# Patient Record
Sex: FEMALE
[De-identification: names, ages, dates, MRNs, and addresses within clinical notes are randomized per-mention and may not be internally consistent; named-entity substitution may affect disease eponyms.]

---

## 2019-12-09 ENCOUNTER — APPOINTMENT (OUTPATIENT)
Dept: INTERNAL MEDICINE | Facility: CLINIC | Age: 64
End: 2019-12-09
Payer: COMMERCIAL

## 2019-12-09 VITALS
SYSTOLIC BLOOD PRESSURE: 120 MMHG | HEIGHT: 62 IN | TEMPERATURE: 98.6 F | OXYGEN SATURATION: 97 % | WEIGHT: 209 LBS | BODY MASS INDEX: 38.46 KG/M2 | DIASTOLIC BLOOD PRESSURE: 80 MMHG | HEART RATE: 90 BPM

## 2019-12-09 DIAGNOSIS — K21.9 GASTRO-ESOPHAGEAL REFLUX DISEASE W/OUT ESOPHAGITIS: ICD-10-CM

## 2019-12-09 DIAGNOSIS — S92.302A FRACTURE OF UNSPECIFIED METATARSAL BONE(S), LEFT FOOT, INITIAL ENCOUNTER FOR CLOSED FRACTURE: ICD-10-CM

## 2019-12-09 DIAGNOSIS — Z80.1 FAMILY HISTORY OF MALIGNANT NEOPLASM OF TRACHEA, BRONCHUS AND LUNG: ICD-10-CM

## 2019-12-09 DIAGNOSIS — E66.9 OBESITY, UNSPECIFIED: ICD-10-CM

## 2019-12-09 DIAGNOSIS — Z83.438 FAMILY HISTORY OF OTHER DISORDER OF LIPOPROTEIN METABOLISM AND OTHER LIPIDEMIA: ICD-10-CM

## 2019-12-09 DIAGNOSIS — Z82.3 FAMILY HISTORY OF STROKE: ICD-10-CM

## 2019-12-09 DIAGNOSIS — Z81.8 FAMILY HISTORY OF OTHER MENTAL AND BEHAVIORAL DISORDERS: ICD-10-CM

## 2019-12-09 DIAGNOSIS — Z00.00 ENCOUNTER FOR GENERAL ADULT MEDICAL EXAMINATION W/OUT ABNORMAL FINDINGS: ICD-10-CM

## 2019-12-09 DIAGNOSIS — Z82.49 FAMILY HISTORY OF ISCHEMIC HEART DISEASE AND OTHER DISEASES OF THE CIRCULATORY SYSTEM: ICD-10-CM

## 2019-12-09 PROCEDURE — 99202 OFFICE O/P NEW SF 15 MIN: CPT

## 2019-12-09 RX ORDER — CALCIUM CARBONATE 300MG(750)
750 TABLET,CHEWABLE ORAL
Refills: 0 | Status: ACTIVE | COMMUNITY
Start: 2019-12-09

## 2019-12-09 NOTE — HISTORY OF PRESENT ILLNESS
[FreeTextEntry8] : Patient presented for the first time for transition of care, she's looking for a new PCP.   She hasn't seen an MD for many years.\par \par Pt lost a lot of weight in 2014, then slowly gaining most of it back.\par \par Pt went to ED in 9/2019 after coming home from [place].  She had LE edema, but work up was negative.  She was given diuretics, and it resolved in about 1 week, and also gave her ABx.\par \par She also L 5th metatarsal fracture in 6/2019.   It's hurting a little.  She will follow up with ortho soon.\par \par Pt had heart evaluation 5-6 years ago, stress test, echo and carotid duplex were OK.\par \par Pt got flu vaccine at the pharmacy.

## 2019-12-09 NOTE — ASSESSMENT
[FreeTextEntry1] : Patient will return for a physical exam.  I gave her prescription to get routine labs prior to her visit.  Patient is also overdue for all healthcare maintenance tests.  She will need to follow-up with GI for repeat colonoscopy.  GYN for repeat Pap smear.  I gave her prescription to get mammogram and DEXA scan.  She was also reminded to have routine eye exam and dental care.

## 2019-12-09 NOTE — PHYSICAL EXAM
[No Acute Distress] : no acute distress [Well Nourished] : well nourished [Well Developed] : well developed [No JVD] : no jugular venous distention [Supple] : supple [No Respiratory Distress] : no respiratory distress  [Clear to Auscultation] : lungs were clear to auscultation bilaterally [Normal Rate] : normal rate  [Regular Rhythm] : with a regular rhythm [No Edema] : there was no peripheral edema [Normal S1, S2] : normal S1 and S2 [Soft] : abdomen soft [No Extremity Clubbing/Cyanosis] : no extremity clubbing/cyanosis [Non Tender] : non-tender [Normal Bowel Sounds] : normal bowel sounds [No CVA Tenderness] : no CVA  tenderness [No Spinal Tenderness] : no spinal tenderness [No Joint Swelling] : no joint swelling [Grossly Normal Strength/Tone] : grossly normal strength/tone [Normal Affect] : the affect was normal [Alert and Oriented x3] : oriented to person, place, and time [Speech Grossly Normal] : speech grossly normal [Normal Mood] : the mood was normal [de-identified] : Obese female in stated age,

## 2021-02-03 ENCOUNTER — APPOINTMENT (OUTPATIENT)
Dept: INTERNAL MEDICINE | Facility: CLINIC | Age: 66
End: 2021-02-03

## 2025-02-09 ENCOUNTER — EMERGENCY (EMERGENCY)
Facility: HOSPITAL | Age: 70
LOS: 1 days | Discharge: HOME | End: 2025-02-09
Attending: EMERGENCY MEDICINE
Payer: COMMERCIAL

## 2025-02-09 VITALS
RESPIRATION RATE: 18 BRPM | OXYGEN SATURATION: 95 % | SYSTOLIC BLOOD PRESSURE: 134 MMHG | DIASTOLIC BLOOD PRESSURE: 86 MMHG | HEART RATE: 71 BPM

## 2025-02-09 VITALS
HEART RATE: 90 BPM | RESPIRATION RATE: 16 BRPM | DIASTOLIC BLOOD PRESSURE: 88 MMHG | SYSTOLIC BLOOD PRESSURE: 137 MMHG | TEMPERATURE: 99 F | OXYGEN SATURATION: 99 %

## 2025-02-09 LAB
ADD ON TEST-SPECIMEN IN LAB: SIGNIFICANT CHANGE UP
ALBUMIN SERPL ELPH-MCNC: 4.1 G/DL — SIGNIFICANT CHANGE UP (ref 3.3–5)
ALP SERPL-CCNC: 83 U/L — SIGNIFICANT CHANGE UP (ref 40–120)
ALT FLD-CCNC: 21 U/L — SIGNIFICANT CHANGE UP (ref 10–45)
ANION GAP SERPL CALC-SCNC: 13 MMOL/L — SIGNIFICANT CHANGE UP (ref 5–17)
APPEARANCE UR: CLEAR — SIGNIFICANT CHANGE UP
APTT BLD: 37.9 SEC — HIGH (ref 24.5–35.6)
AST SERPL-CCNC: 21 U/L — SIGNIFICANT CHANGE UP (ref 10–40)
BACTERIA # UR AUTO: NEGATIVE /HPF — SIGNIFICANT CHANGE UP
BASOPHILS # BLD AUTO: 0.02 K/UL — SIGNIFICANT CHANGE UP (ref 0–0.2)
BASOPHILS NFR BLD AUTO: 0.3 % — SIGNIFICANT CHANGE UP (ref 0–2)
BILIRUB SERPL-MCNC: 0.4 MG/DL — SIGNIFICANT CHANGE UP (ref 0.2–1.2)
BILIRUB UR-MCNC: NEGATIVE — SIGNIFICANT CHANGE UP
BUN SERPL-MCNC: 18 MG/DL — SIGNIFICANT CHANGE UP (ref 7–23)
CALCIUM SERPL-MCNC: 9.9 MG/DL — SIGNIFICANT CHANGE UP (ref 8.4–10.5)
CAST: 2 /LPF — SIGNIFICANT CHANGE UP (ref 0–4)
CHLORIDE SERPL-SCNC: 104 MMOL/L — SIGNIFICANT CHANGE UP (ref 96–108)
CO2 SERPL-SCNC: 23 MMOL/L — SIGNIFICANT CHANGE UP (ref 22–31)
COLOR SPEC: YELLOW — SIGNIFICANT CHANGE UP
CREAT SERPL-MCNC: 1 MG/DL — SIGNIFICANT CHANGE UP (ref 0.5–1.3)
DIFF PNL FLD: NEGATIVE — SIGNIFICANT CHANGE UP
EGFR: 61 ML/MIN/1.73M2 — SIGNIFICANT CHANGE UP
EOSINOPHIL # BLD AUTO: 0.13 K/UL — SIGNIFICANT CHANGE UP (ref 0–0.5)
EOSINOPHIL NFR BLD AUTO: 2.1 % — SIGNIFICANT CHANGE UP (ref 0–6)
GLUCOSE SERPL-MCNC: 116 MG/DL — HIGH (ref 70–99)
GLUCOSE UR QL: NEGATIVE MG/DL — SIGNIFICANT CHANGE UP
HCT VFR BLD CALC: 47.7 % — HIGH (ref 34.5–45)
HGB BLD-MCNC: 15 G/DL — SIGNIFICANT CHANGE UP (ref 11.5–15.5)
IMM GRANULOCYTES NFR BLD AUTO: 0.5 % — SIGNIFICANT CHANGE UP (ref 0–0.9)
INR BLD: 1.06 RATIO — SIGNIFICANT CHANGE UP (ref 0.85–1.16)
KETONES UR-MCNC: ABNORMAL MG/DL
LEUKOCYTE ESTERASE UR-ACNC: NEGATIVE — SIGNIFICANT CHANGE UP
LIDOCAIN IGE QN: 33 U/L — SIGNIFICANT CHANGE UP (ref 7–60)
LYMPHOCYTES # BLD AUTO: 0.66 K/UL — LOW (ref 1–3.3)
LYMPHOCYTES # BLD AUTO: 10.6 % — LOW (ref 13–44)
MCHC RBC-ENTMCNC: 28 PG — SIGNIFICANT CHANGE UP (ref 27–34)
MCHC RBC-ENTMCNC: 31.4 G/DL — LOW (ref 32–36)
MCV RBC AUTO: 89.2 FL — SIGNIFICANT CHANGE UP (ref 80–100)
MONOCYTES # BLD AUTO: 0.51 K/UL — SIGNIFICANT CHANGE UP (ref 0–0.9)
MONOCYTES NFR BLD AUTO: 8.2 % — SIGNIFICANT CHANGE UP (ref 2–14)
NEUTROPHILS # BLD AUTO: 4.89 K/UL — SIGNIFICANT CHANGE UP (ref 1.8–7.4)
NEUTROPHILS NFR BLD AUTO: 78.3 % — HIGH (ref 43–77)
NITRITE UR-MCNC: POSITIVE
NRBC # BLD: 0 /100 WBCS — SIGNIFICANT CHANGE UP (ref 0–0)
NRBC BLD-RTO: 0 /100 WBCS — SIGNIFICANT CHANGE UP (ref 0–0)
PH UR: 5.5 — SIGNIFICANT CHANGE UP (ref 5–8)
PLATELET # BLD AUTO: 223 K/UL — SIGNIFICANT CHANGE UP (ref 150–400)
POTASSIUM SERPL-MCNC: 3.9 MMOL/L — SIGNIFICANT CHANGE UP (ref 3.5–5.3)
POTASSIUM SERPL-SCNC: 3.9 MMOL/L — SIGNIFICANT CHANGE UP (ref 3.5–5.3)
PROT SERPL-MCNC: 7.5 G/DL — SIGNIFICANT CHANGE UP (ref 6–8.3)
PROT UR-MCNC: NEGATIVE MG/DL — SIGNIFICANT CHANGE UP
PROTHROM AB SERPL-ACNC: 12.2 SEC — SIGNIFICANT CHANGE UP (ref 9.9–13.4)
RBC # BLD: 5.35 M/UL — HIGH (ref 3.8–5.2)
RBC # FLD: 14.4 % — SIGNIFICANT CHANGE UP (ref 10.3–14.5)
RBC CASTS # UR COMP ASSIST: 0 /HPF — SIGNIFICANT CHANGE UP (ref 0–4)
REVIEW: SIGNIFICANT CHANGE UP
SODIUM SERPL-SCNC: 140 MMOL/L — SIGNIFICANT CHANGE UP (ref 135–145)
SP GR SPEC: 1.02 — SIGNIFICANT CHANGE UP (ref 1–1.03)
SQUAMOUS # UR AUTO: 2 /HPF — SIGNIFICANT CHANGE UP (ref 0–5)
UROBILINOGEN FLD QL: 0.2 MG/DL — SIGNIFICANT CHANGE UP (ref 0.2–1)
WBC # BLD: 6.24 K/UL — SIGNIFICANT CHANGE UP (ref 3.8–10.5)
WBC # FLD AUTO: 6.24 K/UL — SIGNIFICANT CHANGE UP (ref 3.8–10.5)
WBC UR QL: 8 /HPF — HIGH (ref 0–5)

## 2025-02-09 RX ORDER — ACETAMINOPHEN 160 MG/5ML
1000 SUSPENSION ORAL ONCE
Refills: 0 | Status: COMPLETED | OUTPATIENT
Start: 2025-02-09 | End: 2025-02-09

## 2025-02-09 RX ADMIN — ACETAMINOPHEN 400 MILLIGRAM(S): 160 SUSPENSION ORAL at 15:46

## 2025-02-09 NOTE — ED ADULT NURSE NOTE - NSFALLUNIVINTERV_ED_ALL_ED
Bed/Stretcher in lowest position, wheels locked, appropriate side rails in place/Call bell, personal items and telephone in reach/Instruct patient to call for assistance before getting out of bed/chair/stretcher/Non-slip footwear applied when patient is off stretcher/[place] to call system/Physically safe environment - no spills, clutter or unnecessary equipment/Purposeful proactive rounding/Room/bathroom lighting operational, light cord in reach

## 2025-02-09 NOTE — ED PROVIDER NOTE - NSFOLLOWUPINSTRUCTIONS_ED_ALL_ED_FT
you were seen in the ED for abdominal pain  attached are your CT results which we discussed, please follow up with GI for your fatty liver and MRI of abdomen. please get high resolution CT chest.    Someone from the ED should contact you to help schedule your specialty appointment. if someone does not contact you please use a number provided below to help schedule your appointment.    You are to follow-up in the next 1-2 weeks with [facility] at [facility]. Please call [phone] for an appointment.    for your pain you may use lidocaine patches, and tylenol every 1000mg 6hrs for pain.    SEEK IMMEDIATE MEDICAL CARE IF YOU HAVE ANY OF THE FOLLOWING SYMPTOMS: worsening abdominal pain, uncontrollable vomiting, profuse diarrhea, inability to have bowel movements or pass gas, black or bloody stools, fever accompanying chest pain or back pain, or fainting. These symptoms may represent a serious problem that is an emergency. Do not wait to see if the symptoms will go away. Get medical help right away. Call [phone] and do not drive yourself to the hospital.    you were found to have Atrial fibulation which resolved, you were recommended to start blood thinners to prevent risk of stroke, but stated you will follow up with cardiology for continued management and right now don't want to be on blood thinners due to fall risk.    please return to ED for trouble speaking, numbness, tingling, weakness to upper or lower extremity.    Someone from the ED should contact you to help schedule your specialty appointment with cardiology/ afib clinic. if someone does not contact you please use a number provided below to help schedule your appointment.    You are to follow-up in the next 1-2 weeks with [facility] at [facility]. Please call [phone] for an appointment.

## 2025-02-09 NOTE — ED PROVIDER NOTE - NS ED ATTENDING STATEMENT MOD
I have seen and examined this patient and fully participated in the care of this patient as the teaching attending.  The service was shared with the [facility].  I reviewed and verified the documentation.

## 2025-02-09 NOTE — ED PROVIDER NOTE - OBJECTIVE STATEMENT
69-year-old female with no PMH here for evaluation of right sided groin/hip pain for the past 2 days.  Patient denies any inciting trauma or events, pain is worse with ambulation and changing of position of the leg but states pain can be present even when she is sitting still.  She has been taking Tylenol without improvement of her pain, denies any urinary symptoms, nausea, vomiting, fevers, chills, radiation of pain down her leg, numbness or weakness, back pain, saddle anesthesia or urinary incontinence.

## 2025-02-09 NOTE — ED PROVIDER NOTE - PHYSICAL EXAMINATION
A&Ox3, NAD, well appearing  Lungs CTAB. No w/r/r  Cardiac  RRR  Abd soft, NT/ND, no rebound or guarding.   Extremities: cap refill <2, pulses in distal extremities 4+, no edema. + reproducible pain with R hip flexion, strength 5/5 BL LE, no CVA ttp. no vertebral ttp of the c/t/l spine  Skin without rash.   No focal Deficits

## 2025-02-09 NOTE — ED PROVIDER NOTE - CLINICAL SUMMARY MEDICAL DECISION MAKING FREE TEXT BOX
Right sided abdominal pain/flank pain.  Unclear the cause.  Perhaps musculoskeletal as it does seem to calm down when she is flat and is worse when she is moving.  CT abdomen pelvis with IV contrast CBC CMP lipase.  Urinalysis. -Olu Arellano MD-

## 2025-02-09 NOTE — ED ADULT NURSE NOTE - OBJECTIVE STATEMENT
69 year old female presents to ed via walk in from home complaining of abdominal pain. States pain started on Friday afternoon on right side radiating down to right groin. Denies PMH/PSH. Upon assessment A&O x4, ambulatory and well appearing. Abdomen soft, non tender and non distended. Denies nausea/vomiting diarrhea and urinary symptoms. Describes pain as dull and "burning" worse with movement. To be seen by MD. Bed locked and lowered. Comfort and safety measures maintained.

## 2025-02-09 NOTE — ED PROVIDER NOTE - PROGRESS NOTE DETAILS
Nurse noticed that the heart rate was elevated.  Patient now has a EKG with findings of A-fib with RVR. pt spontaneously converted to nsr. Lety Abdullahi MD (PGY-3 EM): received signout on patient who is pending CT for abd pain. CT indicates no acute pathology, discussed CT results. pain under control. Lety Abdullahi MD (PGY-3 EM): received signout on patient who is pending CT for abd pain. CT indicates no acute pathology, discussed CT results and incidentals. pain under control. discussed need for anticoagulation 2/2 new afib, patient does not want to start AC at this time due to fall risk, understands risk of stroke, will f/u w cards. discussed strict return precautions. I reviewed with the patient abnormal findings on the CAT scan most notably incidental findings in the abdomen which require MRI and in the lung which require dedicated CT and fatty liver which requires GI specialist follow-up.  I offered to place the patient in observation for her new onset A-fib which is since resolved and she declined this.  We discussed alternates which would be outpatient follow-up with cardiology which is what she would prefer.  I told her that typically we would put her on anticoagulation until she follows up with cardiology in case she goes back into A-fib.  This is especially important because she does not sense that she was in A-fib when she was admitted and thus we cannot gauge duration of time.  I explained to her that A-fib causes increased risk of stroke and that the blood thinner decreases the risk of stroke.  Holding off on starting blood thinner would increase her risk of stroke and the duration of time that she needs to see the cardiologist.  She states that she understands this risk and still does not want to start the blood thinner until she gets outpatient follow-up.  She has important business to do tomorrow morning which would preclude her from staying in the hospital in observation till tomorrow.  I advised her that I can start her on a blood thinner and she could stop at any time when she talks with cardiologist however she is declining to do this.  She is very nervous about falling and use of anticoagulation.  I explained to her risks and benefits. -Olu Arellano MD-

## 2025-02-09 NOTE — ED PROVIDER NOTE - PATIENT PORTAL LINK FT
You can access the FollowMyHealth Patient Portal offered by [facility] by registering at the following website: [email or web address]. By joining [facility]’s FollowMyHealth portal, you will also be able to view your health information using other applications (apps) compatible with our system.

## 2025-02-09 NOTE — ED PROVIDER NOTE - NSPTACCESSSVCSAPPTDETAILS_ED_ALL_ED_FT
urgent referral to Afib clinic/ cards for new afib.   urgent referral to GI for abdominal pain and fatty liver urgent referral to Afib clinic/ cards for new afib.   urgent referral to GI for abdominal pain and fatty liver  Urgent referral to PCP for follow up of incidental findings on ct

## 2025-02-09 NOTE — ED ADULT NURSE REASSESSMENT NOTE - NS ED NURSE REASSESS COMMENT FT1
Patient a/ox4, breathing spontanoeusly. Patient endorses lightheadedness sensation. FS noted to be 99. VSS. MD Abdullahi aware and at bedside assessing patient. Safety and comfort provided.

## 2025-02-11 LAB
-  AMOXICILLIN/CLAVULANIC ACID: SIGNIFICANT CHANGE UP
-  AMPICILLIN/SULBACTAM: SIGNIFICANT CHANGE UP
-  AMPICILLIN: SIGNIFICANT CHANGE UP
-  AZTREONAM: SIGNIFICANT CHANGE UP
-  CEFAZOLIN: SIGNIFICANT CHANGE UP
-  CEFEPIME: SIGNIFICANT CHANGE UP
-  CEFOXITIN: SIGNIFICANT CHANGE UP
-  CEFTRIAXONE: SIGNIFICANT CHANGE UP
-  CEFUROXIME: SIGNIFICANT CHANGE UP
-  CIPROFLOXACIN: SIGNIFICANT CHANGE UP
-  ERTAPENEM: SIGNIFICANT CHANGE UP
-  GENTAMICIN: SIGNIFICANT CHANGE UP
-  IMIPENEM: SIGNIFICANT CHANGE UP
-  LEVOFLOXACIN: SIGNIFICANT CHANGE UP
-  MEROPENEM: SIGNIFICANT CHANGE UP
-  NITROFURANTOIN: SIGNIFICANT CHANGE UP
-  PIPERACILLIN/TAZOBACTAM: SIGNIFICANT CHANGE UP
-  TOBRAMYCIN: SIGNIFICANT CHANGE UP
-  TRIMETHOPRIM/SULFAMETHOXAZOLE: SIGNIFICANT CHANGE UP
CULTURE RESULTS: ABNORMAL
METHOD TYPE: SIGNIFICANT CHANGE UP
ORGANISM # SPEC MICROSCOPIC CNT: ABNORMAL
ORGANISM # SPEC MICROSCOPIC CNT: ABNORMAL
SPECIMEN SOURCE: SIGNIFICANT CHANGE UP

## 2025-02-11 RX ORDER — CEFPODOXIME PROXETIL 200 MG
1 TABLET ORAL
Qty: 10 | Refills: 0
Start: 2025-02-11 | End: 2025-02-15

## 2025-03-22 ENCOUNTER — APPOINTMENT (OUTPATIENT)
Dept: ORTHOPEDIC SURGERY | Facility: CLINIC | Age: 70
End: 2025-03-22
Payer: MEDICARE

## 2025-03-22 ENCOUNTER — RESULT CHARGE (OUTPATIENT)
Age: 70
End: 2025-03-22

## 2025-03-22 DIAGNOSIS — M17.11 UNILATERAL PRIMARY OSTEOARTHRITIS, RIGHT KNEE: ICD-10-CM

## 2025-03-22 DIAGNOSIS — M17.12 UNILATERAL PRIMARY OSTEOARTHRITIS, LEFT KNEE: ICD-10-CM

## 2025-03-22 PROCEDURE — 20610 DRAIN/INJ JOINT/BURSA W/O US: CPT | Mod: 50

## 2025-03-22 PROCEDURE — J3490M: CUSTOM | Mod: NC

## 2025-03-22 PROCEDURE — 73562 X-RAY EXAM OF KNEE 3: CPT | Mod: LT

## 2025-03-22 PROCEDURE — 99204 OFFICE O/P NEW MOD 45 MIN: CPT | Mod: 25

## 2025-03-22 RX ORDER — MELOXICAM 15 MG/1
15 TABLET ORAL DAILY
Qty: 30 | Refills: 2 | Status: ACTIVE | COMMUNITY
Start: 2025-03-22 | End: 1900-01-01

## 2025-03-23 PROBLEM — M17.11 PRIMARY OSTEOARTHRITIS OF RIGHT KNEE: Status: ACTIVE | Noted: 2025-03-22

## 2025-03-23 PROBLEM — M17.12 PRIMARY OSTEOARTHRITIS OF LEFT KNEE: Status: ACTIVE | Noted: 2025-03-22

## 2025-04-23 ENCOUNTER — APPOINTMENT (OUTPATIENT)
Dept: ORTHOPEDIC SURGERY | Facility: CLINIC | Age: 70
End: 2025-04-23

## 2025-04-23 VITALS — WEIGHT: 200 LBS | HEIGHT: 62 IN | BODY MASS INDEX: 36.8 KG/M2

## 2025-04-23 PROCEDURE — 73562 X-RAY EXAM OF KNEE 3: CPT | Mod: 50

## 2025-04-23 PROCEDURE — 99204 OFFICE O/P NEW MOD 45 MIN: CPT | Mod: 25

## 2025-04-23 PROCEDURE — 20610 DRAIN/INJ JOINT/BURSA W/O US: CPT | Mod: 50

## 2025-04-30 ENCOUNTER — APPOINTMENT (OUTPATIENT)
Dept: ORTHOPEDIC SURGERY | Facility: CLINIC | Age: 70
End: 2025-04-30
Payer: MEDICARE

## 2025-04-30 ENCOUNTER — APPOINTMENT (OUTPATIENT)
Dept: MRI IMAGING | Facility: CLINIC | Age: 70
End: 2025-04-30
Payer: MEDICARE

## 2025-04-30 VITALS — HEIGHT: 62 IN | WEIGHT: 200 LBS | BODY MASS INDEX: 36.8 KG/M2

## 2025-04-30 DIAGNOSIS — S43.422A SPRAIN OF LEFT ROTATOR CUFF CAPSULE, INITIAL ENCOUNTER: ICD-10-CM

## 2025-04-30 DIAGNOSIS — E66.9 OBESITY, UNSPECIFIED: ICD-10-CM

## 2025-04-30 PROBLEM — M17.0 PRIMARY LOCALIZED OSTEOARTHRITIS OF BOTH KNEES: Status: ACTIVE | Noted: 2025-04-30

## 2025-04-30 PROCEDURE — 73010 X-RAY EXAM OF SHOULDER BLADE: CPT | Mod: LT

## 2025-04-30 PROCEDURE — 73030 X-RAY EXAM OF SHOULDER: CPT | Mod: LT

## 2025-04-30 PROCEDURE — 99214 OFFICE O/P EST MOD 30 MIN: CPT | Mod: 25

## 2025-04-30 PROCEDURE — 20610 DRAIN/INJ JOINT/BURSA W/O US: CPT | Mod: 50

## 2025-04-30 PROCEDURE — 73221 MRI JOINT UPR EXTREM W/O DYE: CPT | Mod: LT

## 2025-05-03 ENCOUNTER — TRANSCRIPTION ENCOUNTER (OUTPATIENT)
Age: 70
End: 2025-05-03

## 2025-05-07 ENCOUNTER — APPOINTMENT (OUTPATIENT)
Dept: ORTHOPEDIC SURGERY | Facility: CLINIC | Age: 70
End: 2025-05-07
Payer: MEDICARE

## 2025-05-07 VITALS — WEIGHT: 200 LBS | BODY MASS INDEX: 36.8 KG/M2 | HEIGHT: 62 IN

## 2025-05-07 DIAGNOSIS — S46.012D STRAIN OF MUSCLE(S) AND TENDON(S) OF THE ROTATOR CUFF OF LEFT SHOULDER, SUBSEQUENT ENCOUNTER: ICD-10-CM

## 2025-05-07 DIAGNOSIS — M17.12 UNILATERAL PRIMARY OSTEOARTHRITIS, LEFT KNEE: ICD-10-CM

## 2025-05-07 DIAGNOSIS — M17.0 BILATERAL PRIMARY OSTEOARTHRITIS OF KNEE: ICD-10-CM

## 2025-05-07 DIAGNOSIS — M17.11 UNILATERAL PRIMARY OSTEOARTHRITIS, RIGHT KNEE: ICD-10-CM

## 2025-05-07 PROCEDURE — 99214 OFFICE O/P EST MOD 30 MIN: CPT | Mod: 25

## 2025-05-07 PROCEDURE — 20610 DRAIN/INJ JOINT/BURSA W/O US: CPT | Mod: 50

## 2025-05-21 ENCOUNTER — APPOINTMENT (OUTPATIENT)
Dept: ORTHOPEDIC SURGERY | Facility: CLINIC | Age: 70
End: 2025-05-21
Payer: MEDICARE

## 2025-05-21 VITALS — WEIGHT: 200 LBS | BODY MASS INDEX: 36.8 KG/M2 | HEIGHT: 62 IN

## 2025-05-21 DIAGNOSIS — S43.422A SPRAIN OF LEFT ROTATOR CUFF CAPSULE, INITIAL ENCOUNTER: ICD-10-CM

## 2025-05-21 DIAGNOSIS — S46.012D STRAIN OF MUSCLE(S) AND TENDON(S) OF THE ROTATOR CUFF OF LEFT SHOULDER, SUBSEQUENT ENCOUNTER: ICD-10-CM

## 2025-05-21 PROCEDURE — 20611 DRAIN/INJ JOINT/BURSA W/US: CPT | Mod: LT

## 2025-05-21 PROCEDURE — 99213 OFFICE O/P EST LOW 20 MIN: CPT | Mod: 25

## 2025-05-21 PROCEDURE — J3490M: CUSTOM | Mod: NC,JZ

## 2025-05-23 ENCOUNTER — EMERGENCY (EMERGENCY)
Facility: HOSPITAL | Age: 70
LOS: 1 days | End: 2025-05-23
Attending: EMERGENCY MEDICINE
Payer: MEDICARE

## 2025-05-23 VITALS
HEART RATE: 78 BPM | OXYGEN SATURATION: 98 % | DIASTOLIC BLOOD PRESSURE: 92 MMHG | TEMPERATURE: 98 F | HEIGHT: 62 IN | WEIGHT: 188.94 LBS | SYSTOLIC BLOOD PRESSURE: 149 MMHG | RESPIRATION RATE: 18 BRPM

## 2025-05-23 VITALS
TEMPERATURE: 98 F | SYSTOLIC BLOOD PRESSURE: 153 MMHG | DIASTOLIC BLOOD PRESSURE: 80 MMHG | RESPIRATION RATE: 20 BRPM | HEART RATE: 83 BPM | OXYGEN SATURATION: 99 %

## 2025-05-23 PROCEDURE — 73090 X-RAY EXAM OF FOREARM: CPT

## 2025-05-23 PROCEDURE — 99285 EMERGENCY DEPT VISIT HI MDM: CPT | Mod: 25

## 2025-05-23 PROCEDURE — 73130 X-RAY EXAM OF HAND: CPT

## 2025-05-23 PROCEDURE — 71045 X-RAY EXAM CHEST 1 VIEW: CPT | Mod: 26

## 2025-05-23 PROCEDURE — G0168: CPT | Mod: 59

## 2025-05-23 PROCEDURE — 25605 CLTX DST RDL FX/EPHYS SEP W/: CPT | Mod: RT

## 2025-05-23 PROCEDURE — 12002 RPR S/N/AX/GEN/TRNK2.6-7.5CM: CPT | Mod: XU

## 2025-05-23 PROCEDURE — 73110 X-RAY EXAM OF WRIST: CPT | Mod: 26,50,77

## 2025-05-23 PROCEDURE — 70450 CT HEAD/BRAIN W/O DYE: CPT | Mod: 26

## 2025-05-23 PROCEDURE — 73080 X-RAY EXAM OF ELBOW: CPT

## 2025-05-23 PROCEDURE — 12001 RPR S/N/AX/GEN/TRNK 2.5CM/<: CPT

## 2025-05-23 PROCEDURE — 73030 X-RAY EXAM OF SHOULDER: CPT | Mod: 26,LT

## 2025-05-23 PROCEDURE — 71045 X-RAY EXAM CHEST 1 VIEW: CPT

## 2025-05-23 PROCEDURE — 90471 IMMUNIZATION ADMIN: CPT

## 2025-05-23 PROCEDURE — 73030 X-RAY EXAM OF SHOULDER: CPT

## 2025-05-23 PROCEDURE — 90715 TDAP VACCINE 7 YRS/> IM: CPT

## 2025-05-23 PROCEDURE — 99285 EMERGENCY DEPT VISIT HI MDM: CPT | Mod: 25,GC

## 2025-05-23 PROCEDURE — 70450 CT HEAD/BRAIN W/O DYE: CPT

## 2025-05-23 PROCEDURE — 73110 X-RAY EXAM OF WRIST: CPT | Mod: 26,RT

## 2025-05-23 PROCEDURE — 73130 X-RAY EXAM OF HAND: CPT | Mod: 26,50

## 2025-05-23 PROCEDURE — 73110 X-RAY EXAM OF WRIST: CPT

## 2025-05-23 PROCEDURE — 73080 X-RAY EXAM OF ELBOW: CPT | Mod: 26,RT

## 2025-05-23 PROCEDURE — 73090 X-RAY EXAM OF FOREARM: CPT | Mod: 26,50,77

## 2025-05-23 PROCEDURE — 73090 X-RAY EXAM OF FOREARM: CPT | Mod: 26,LT

## 2025-05-23 RX ORDER — CLOSTRIDIUM TETANI TOXOID ANTIGEN (FORMALDEHYDE INACTIVATED), CORYNEBACTERIUM DIPHTHERIAE TOXOID ANTIGEN (FORMALDEHYDE INACTIVATED), BORDETELLA PERTUSSIS TOXOID ANTIGEN (GLUTARALDEHYDE INACTIVATED), BORDETELLA PERTUSSIS FILAMENTOUS HEMAGGLUTININ ANTIGEN (FORMALDEHYDE INACTIVATED), BORDETELLA PERTUSSIS PERTACTIN ANTIGEN, AND BORDETELLA PERTUSSIS FIMBRIAE 2/3 ANTIGEN 5; 2; 2.5; 5; 3; 5 [LF]/.5ML; [LF]/.5ML; UG/.5ML; UG/.5ML; UG/.5ML; UG/.5ML
0.5 INJECTION, SUSPENSION INTRAMUSCULAR ONCE
Refills: 0 | Status: COMPLETED | OUTPATIENT
Start: 2025-05-23 | End: 2025-05-23

## 2025-05-23 RX ORDER — ACETAMINOPHEN 500 MG/5ML
975 LIQUID (ML) ORAL ONCE
Refills: 0 | Status: COMPLETED | OUTPATIENT
Start: 2025-05-23 | End: 2025-05-23

## 2025-05-23 RX ORDER — LIDOCAINE HCL/PF 10 MG/ML
10 VIAL (ML) INJECTION ONCE
Refills: 0 | Status: COMPLETED | OUTPATIENT
Start: 2025-05-23 | End: 2025-05-23

## 2025-05-23 RX ORDER — LIDOCAINE HYDROCHLORIDE 20 MG/ML
1 JELLY TOPICAL ONCE
Refills: 0 | Status: COMPLETED | OUTPATIENT
Start: 2025-05-23 | End: 2025-05-23

## 2025-05-23 RX ORDER — IBUPROFEN 200 MG
600 TABLET ORAL ONCE
Refills: 0 | Status: COMPLETED | OUTPATIENT
Start: 2025-05-23 | End: 2025-05-23

## 2025-05-23 RX ADMIN — Medication 600 MILLIGRAM(S): at 08:45

## 2025-05-23 RX ADMIN — LIDOCAINE HYDROCHLORIDE 1 PATCH: 20 JELLY TOPICAL at 08:45

## 2025-05-23 RX ADMIN — CLOSTRIDIUM TETANI TOXOID ANTIGEN (FORMALDEHYDE INACTIVATED), CORYNEBACTERIUM DIPHTHERIAE TOXOID ANTIGEN (FORMALDEHYDE INACTIVATED), BORDETELLA PERTUSSIS TOXOID ANTIGEN (GLUTARALDEHYDE INACTIVATED), BORDETELLA PERTUSSIS FILAMENTOUS HEMAGGLUTININ ANTIGEN (FORMALDEHYDE INACTIVATED), BORDETELLA PERTUSSIS PERTACTIN ANTIGEN, AND BORDETELLA PERTUSSIS FIMBRIAE 2/3 ANTIGEN 0.5 MILLILITER(S): 5; 2; 2.5; 5; 3; 5 INJECTION, SUSPENSION INTRAMUSCULAR at 08:58

## 2025-05-23 RX ADMIN — Medication 10 MILLILITER(S): at 12:12

## 2025-05-23 RX ADMIN — Medication 975 MILLIGRAM(S): at 08:44

## 2025-05-23 RX ADMIN — Medication 600 MILLIGRAM(S): at 09:24

## 2025-05-23 RX ADMIN — Medication 10 MILLILITER(S): at 09:02

## 2025-05-23 RX ADMIN — Medication 975 MILLIGRAM(S): at 09:24

## 2025-05-23 NOTE — ED PROCEDURE NOTE - ATTENDING CONTRIBUTION TO CARE
I Angelito Rodrigez MD discussed the procedure with the resident and or ACP and went over risks and benefits as well as indications for the procedure. I was present for key portions of the procedure itself and assisted as needed.
I Angelito Rodrigez MD discussed the procedure with the resident and or ACP and went over risks and benefits as well as indications for the procedure. I was present for key portions of the procedure itself and assisted as needed.

## 2025-05-23 NOTE — ED ADULT NURSE NOTE - NSFALLRISKINTERV_ED_ALL_ED
Assistance OOB with selected safe patient handling equipment if applicable/Assistance with ambulation/Communicate fall risk and risk factors to all staff, patient, and family/Monitor gait and stability/Provide visual cue: yellow wristband, yellow gown, etc/Reinforce activity limits and safety measures with patient and family/Call bell, personal items and telephone in reach/Instruct patient to call for assistance before getting out of bed/chair/stretcher/Non-slip footwear applied when patient is off stretcher/[place] to call system/Physically safe environment - no spills, clutter or unnecessary equipment/Purposeful Proactive Rounding/Room/bathroom lighting operational, light cord in reach

## 2025-05-23 NOTE — ED PROVIDER NOTE - PROGRESS NOTE DETAILS
[place], DO (PGY2): xrays notable for acute impacted fracture of distal radial metaphysis on the right. Ortho consulted. Will come evaluate [place], DO (PGY2): CTH with no acute findings. Patient able to ambulate. Fracture reduced by ortho. Patient to follow up outpatient. Will dc. Patient feels comfortable going home. All questions answered

## 2025-05-23 NOTE — ED PROVIDER NOTE - NSFOLLOWUPINSTRUCTIONS_ED_ALL_ED_FT
You were seen in the emergency department today after a fall. You have a fracture on your right radius that was splinted by the orthopedic team.    Please follow up with Dr. Manley within one week. You may bring copies of your results with you (provided in your discharge paperwork).     - You may take 500-1000 mg acetaminophen (Tylenol) every 6 hours, as needed for pain. Do not take more than 4000 mg in a 24 hour period. Be aware many over the counter and prescription medications also contain acetaminophen (Tylenol).  - You may take 600 mg ibuprofen every 8 hours, with food, as needed for pain.  - You may also apply a lidocaine patch every 12 hours    Please see below on how to care for your splint.    PLEASE RETURN TO THE EMERGENCY DEPARTMENT if you experience the following:  - Very bad pain, numbness, tingling, or skin that turns cold or another color  - Fever  - Chest pain  - Shortness of breath    --------    Cast or Splint Care    Casts and splints are supports that are worn to protect broken bones and other injuries. A cast or splint may hold a bone still and in the correct position while it heals. Casts and splints may also help with pain, swelling, and muscle spasms.    A cast is a hardened support that is usually made of fiberglass or plaster. It is custom-fit to the body and offers more protection than a splint. Most casts cannot be taken off and put back on. A splint is a type of soft support that is usually made from cloth and elastic. It can be adjusted or taken off as needed. Often, splints are used on broken bones at first. Later, a cast can replace the splint.    What are the risks?  In some cases, wearing a cast or splint can make it so that less blood gets to the wrist or hand or to the foot and toes. This can happen if there is a lot of swelling or if the cast or splint is too tight. Limited blood supply can cause a problem called compartment syndrome. This can lead to lasting damage.   Symptoms include:  - Pain that gets worse.  - Numbness and tingling.  - Changes in skin color, including paleness or a bluish color.  - Cold fingers or toes.  Other problems from wearing a cast or splint can include:  - Skin irritation that can cause: Itching, rash, skin sores, skin infection, limb stiffness or weakness.    How to care for a cast or splint that cannot be taken off  - Do not put pressure on any part of the cast or splint until it is fully hardened.  - Do not stick anything inside the cast or splint to scratch your skin.  - Check the skin around the cast or splint every day. Tell your doctor if you see problems.  - You may put lotion on dry skin around the cast or splint. Do not put lotion on the skin under the cast or splint.  - Keep the cast or splint clean and dry.    How to care for your splint that you can take off  - Wear the splint as told by your doctor. Take it off only as told by your doctor.  - Check the skin around it every day. Tell your doctor if you see problems.  - Loosen it if your fingers or toes: Tingle, become numb, turn cold and blue.  - Keep it clean and dry. Clean your splint as told by your doctor. Use mild soap and water and let it air-dry. Do not use heat on the splint.    Follow these instructions at home:    BATHING:     - Do not take baths, swim, or use a hot tub until your doctor approves. Ask your doctor if you may take showers. You may only be allowed to take sponge baths.  - If the cast or splint is not waterproof:  - Do not let it get wet.  - Cover it with a watertight covering when you take a bath or a shower.    MANAGING PAIN, STIFFNESS, SWELLING:    If told, put ice on the affected area. To do this:  - If you have a cast or splint that can be taken off, take it off as told by your doctor.  - Put ice in a plastic bag.  - Place a towel between your skin and the bag or between your cast and the bag.  - Leave the ice on for 20 minutes, 2–3 times a day.  - Take off the ice if your skin turns bright red. This is very important. If you cannot feel pain, heat, or cold, you have a greater risk of damage to the area.  - Move your fingers or toes often.  - Raise the injured area above the level of your heart while you are sitting or lying down.    Safety    Do not use your injured leg or foot to support your body weight until your doctor says that you can.  Use crutches or other helpful (assistive) devices as told by your doctor.  Ask your doctor when it is safe to drive if you have a cast or splint on part of your body.  General instructions    Take over-the-counter and prescription medicines only as told by your doctor.  Return to your normal activities as told by your doctor. Ask your doctor what activities are safe for you.  Keep all follow-up visits. This is important.    Contact a doctor if:   - The skin around the cast or splint gets red or raw.  - The skin under the cast is very itchy or painful.  - Your cast or splint: gets damaged, feels very uncomfortable, is too tight or too loose, your cast becomes wet or it starts to have a soft spot or area, there is a bad smell coming from under your cast, you get an object stuck under your cast.    Get help right away if:  - You get any symptoms of compartment syndrome, such as:  - Very bad pain or pressure under the cast.  - Numbness, tingling, coldness, or pale or bluish skin.  - The part of your body above or below the cast is swollen, and it turns a different color (is discolored).  - You cannot feel or move your fingers or toes.  - Your pain gets worse.  - There is fluid leaking through the cast.  - You have trouble breathing or shortness of breath.  - You have chest pain.  These symptoms may be an emergency. Get help right away. Call your local emergency services (911 in the [place]  Do not wait to see if the symptoms will go away.  Do not drive yourself to the hospital.

## 2025-05-23 NOTE — ED PROVIDER NOTE - CLINICAL SUMMARY MEDICAL DECISION MAKING FREE TEXT BOX
Elia: Patient is a 69-year-old who presents to the emergency department after falling down 12 steps.  Patient tripped and fell did not become dizzy beforehand.  Patient struck her head but did not lose consciousness and has not had vomiting but has a large hematoma.  Would CT the head looking for intracranial hemorrhage.  Patient with no neck pain to palpation and is awake and alert and talking to us and on distracted so we will use Nexus to clear the neck.  Patient with deformity and pain at her right wrist and some lacerations on her left hand.  Will evaluate those with imaging and decide whether they need stitches.  Will start to treat patient's pain and give tetanus shot.

## 2025-05-23 NOTE — ED PROVIDER NOTE - ATTENDING CONTRIBUTION TO CARE
I performed a history and physical exam of the patient and discussed their management with the resident and /or advanced care provider. I reviewed the resident and /or ACP's note and agree with the documented findings and plan of care. My medical decision making and observations are found above.  Pain and deformity rt wrist, abraision/lacerations over lt fingers, large hematoma lt temporal area.

## 2025-05-23 NOTE — ED PROVIDER NOTE - PATIENT PORTAL LINK FT
You can access the FollowMyHealth Patient Portal offered by [facility] by registering at the following website: [email or web address]. By joining [facility]’s FollowMyHealth portal, you will also be able to view your health information using other applications (apps) compatible with our system.

## 2025-05-23 NOTE — ED PROVIDER NOTE - CARE PROVIDER_API CALL
Jessica Manley  Surgery of the Hand  [address], [address]  [place] [address]  Phone: [phone]  Fax: [phone]  Follow Up Time: 4-6 Days

## 2025-05-23 NOTE — ED PROVIDER NOTE - OBJECTIVE STATEMENT
[place], DO (PGY2): 70 y/o female, no medical history, presenting to the ED today after mechanical fall down 12 steps. No symptoms prior to or after the fall. No LOC, dizziness, or lightheadedness currently. Also, no chest pain, SOB, neck pain, abdominal pain, or headache. Reports that she sustained a bump to left forehead and lacerations to digits of left hand. Also reporting bilateral wrist and hand pain, and left shoulder pain. Of note, patient has a prior rotator cuff injury on the left that she takes cortisone shots for, last one was yesterday. Not UTD on tetanus.

## 2025-05-23 NOTE — CONSULT NOTE ADULT - SUBJECTIVE AND OBJECTIVE BOX
HPI  69yFemale R-hand dominant w/ no significant past medical hx c/o R wrist pain s/p mechanical fall. The patient slipped and slid down 12 steps in her house today.  Endorses headstrike or LOC. Denies numbness/tingling in the RUE. Denies any other extrtrauma/injuries at this time.    ROS  Negative unless otherwise specified in HPI.    PAST MEDICAL & SURGICAL Hx  PAST MEDICAL & SURGICAL HISTORY:      MEDICATIONS  Home Medications:      ALLERGIES  No Known Allergies      FAMILY Hx  FAMILY HISTORY:      SOCIAL Hx  Social History:      VITALS  Vital Signs Last 24 Hrs  T(C): 36.4 (23 May 2025 09:50), Max: 36.4 (23 May 2025 08:04)  T(F): 97.5 (23 May 2025 09:50), Max: 97.6 (23 May 2025 08:04)  HR: 74 (23 May 2025 09:50) (74 - 78)  BP: 136/69 (23 May 2025 09:50) (136/69 - 149/92)  BP(mean): 96 (23 May 2025 09:50) (96 - 96)  RR: 18 (23 May 2025 09:50) (18 - 18)  SpO2: 98% (23 May 2025 09:50) (98% - 98%)    Parameters below as of 23 May 2025 09:50  Patient On (Oxygen Delivery Method): room air        PHYSICAL EXAM  Gen: Lying in bed, NAD  Resp: No increased WOB  RUE:  Skin intact over wrist , +visible wrist deformity, +edema and +ecchymosis over wrist  skin abration on the 2nd digit volor <1.5 cm and  +TTP over wrist, no TTP along remainder of extremity; compartments soft  Limited ROM at wrist 2/2 pain  Motor: AIN/PIN/U intact  Sensory: Med/Rad/U SILT  +Rad pulse, WWP    Secondary survey:  No TTP along spine or other extremities, pelvis grossly stable, SILT and soft compartments throughout    LABS              IMAGING  XRs: R distal radius fx and triquetrum avulsion fx (personal read)    PROCEDURE  Using aseptic technique, a hematoma block was administered to the fracture site using 5cc of 1% lidocaine without epinephrine. Closed reduction was subsequently performed and a well-padded, well-molded plaster splint applied. The patient tolerated the procedure well without evidence of complications. The patient was neurovascularly intact following reduction. Post-reduction XRs demonstrated acceptable alignment. The patient was informed about splint precautions (keep dry, do not stick anything inside, monitor for signs/symptoms of increased compartmental pressure: uncontrolled pain, worsening numbness/tingling, severe pain with movement of the fingers) and verbalized understanding.    ASSESSMENT & PLAN  69yFemale w/ R distal radius fx  and triquetrum avulsion fx  s/p closed reduction and immobilization.  -NWB RUE in a sugar tong splint (and sling for comfort PRN, does not need to wear sling at all times)  -splint precautions  -pain control  -ice/cold compress, elevation  -finger ROM encouraged to prevent stiffness  -no acute ortho surgery at this time  -f/u outpt with Dr. Fuller within 1 week, call office for appt      FINAL Plans pending discusion with dr. fuller
NOT COPLETE; ORTHO TO SEE     HPI  69yFemale _-hand dominant w/ _ c/o R wrist pain s/p mechanical fall. Denies headstrike or LOC. Denies numbness/tingling in the RUE. Denies any other trauma/injuries at this time.    ROS  Negative unless otherwise specified in HPI.    PAST MEDICAL & SURGICAL Hx  PAST MEDICAL & SURGICAL HISTORY:      MEDICATIONS  Home Medications:      ALLERGIES  No Known Allergies      FAMILY Hx  FAMILY HISTORY:      SOCIAL Hx  Social History:      VITALS  Vital Signs Last 24 Hrs  T(C): 36.4 (23 May 2025 09:50), Max: 36.4 (23 May 2025 08:04)  T(F): 97.5 (23 May 2025 09:50), Max: 97.6 (23 May 2025 08:04)  HR: 74 (23 May 2025 09:50) (74 - 78)  BP: 136/69 (23 May 2025 09:50) (136/69 - 149/92)  BP(mean): 96 (23 May 2025 09:50) (96 - 96)  RR: 18 (23 May 2025 09:50) (18 - 18)  SpO2: 98% (23 May 2025 09:50) (98% - 98%)    Parameters below as of 23 May 2025 09:50  Patient On (Oxygen Delivery Method): room air        PHYSICAL EXAM  Gen: Lying in bed, NAD  Resp: No increased WOB  RUE:  Skin intact, +visible wrist deformity, +edema and +ecchymosis over wrist  +TTP over wrist, no TTP along remainder of extremity; compartments soft  Limited ROM at wrist 2/2 pain  Motor: AIN/PIN/U intact  Sensory: Med/Rad/U SILT  +Rad pulse, WWP    Secondary survey:  No TTP along spine or other extremities, pelvis grossly stable, SILT and soft compartments throughout    LABS              IMAGING  XRs: R distal radius fx (personal read)    PROCEDURE  Using aseptic technique, a hematoma block was administered to the fracture site using 10cc of 1% lidocaine without epinephrine. Closed reduction was subsequently performed and a well-padded, well-molded plaster splint applied. The patient tolerated the procedure well without evidence of complications. The patient was neurovascularly intact following reduction. Post-reduction XRs demonstrated acceptable alignment. The patient was informed about splint precautions (keep dry, do not stick anything inside, monitor for signs/symptoms of increased compartmental pressure: uncontrolled pain, worsening numbness/tingling, severe pain with movement of the fingers) and verbalized understanding.    ASSESSMENT & PLAN  69yFemale w/ R distal radius fx s/p closed reduction and immobilization.  -NWB RUE in a sugar tong splint (and sling for comfort PRN, does not need to wear sling at all times)  -splint precautions  -[anything unique to this pt]  -pain control  -ice/cold compress, elevation  -finger ROM encouraged to prevent stiffness  -no acute ortho surgery at this time  -f/u outpt with  _ within 1 week, call office for appt

## 2025-05-23 NOTE — ED PROVIDER NOTE - PHYSICAL EXAMINATION
Constitutional: Well developed, well nourished. NAD  TRAUMA: ABC intact. GCS 15.   Head: large hematoma to left forehead  Eyes: PERRL. EOMI. No Raccoon eyes.   ENT: No nasal discharge. No septal hematoma. No [place] sign. Mucous membranes moist.  Neck: Supple. Painless ROM. No midline tenderness, stepoffs.  Cardiovascular: Normal S1, S2. Regular rate and rhythm. No murmurs, rubs, or gallops.  Pulmonary: Normal respiratory rate and effort. Lungs clear to auscultation bilaterally. No wheezing, rales, or rhonchi.  CHEST: No chest wall tenderness, crepitus.  Abdominal: Soft. Nondistended. Nontender. No rebound, guarding, rigidity.  BACK: No midline T/L/S tenderness, stepoffs.  Extremities. Pelvis stable. No traumatic deformities, tenderness of extremities.  Skin: lacerations to PIP of both index and middle fingers of left hand, hemostatic at time of exam  Neuro: AAOx3. Strength 5/5 in all extremities. Sensation intact throughout. No focal neurological deficits.  Psych: Normal mood. Normal affect.

## 2025-05-23 NOTE — ED ADULT NURSE NOTE - OBJECTIVE STATEMENT
Patient  is  alert  and  oriented  x4.  Color is  good  and  skin warm  to  touch.  She  tripped  and  fell down  the  stairs.  Hematoma to  left  frontal area noted.  She  is  c/o  pain to her  right wrist and  left  index.  She  denies  any  LOC.

## 2025-05-23 NOTE — ED ADULT TRIAGE NOTE - CHIEF COMPLAINT QUOTE
fell down 12 steps no LOC states she did hit her head "feels like my right wrist is broken" laceration left index finger (bleeding is controlled)

## 2025-05-28 ENCOUNTER — APPOINTMENT (OUTPATIENT)
Dept: ORTHOPEDIC SURGERY | Facility: CLINIC | Age: 70
End: 2025-05-28

## 2025-05-28 ENCOUNTER — NON-APPOINTMENT (OUTPATIENT)
Age: 70
End: 2025-05-28

## 2025-06-03 ENCOUNTER — APPOINTMENT (OUTPATIENT)
Dept: ORTHOPEDIC SURGERY | Facility: CLINIC | Age: 70
End: 2025-06-03

## 2025-06-03 DIAGNOSIS — S52.551D OTHER EXTRAARTICULAR FRACTURE OF LOWER END OF RIGHT RADIUS, SUBSEQUENT ENCOUNTER FOR CLOSED FRACTURE WITH ROUTINE HEALING: ICD-10-CM

## 2025-06-03 PROCEDURE — 29075 APPL CST ELBW FNGR SHORT ARM: CPT | Mod: RT

## 2025-06-03 PROCEDURE — 99204 OFFICE O/P NEW MOD 45 MIN: CPT | Mod: 25

## 2025-06-03 PROCEDURE — 73110 X-RAY EXAM OF WRIST: CPT | Mod: RT

## 2025-06-17 RX ORDER — MELOXICAM 15 MG/1
15 TABLET ORAL
Qty: 30 | Refills: 11 | Status: ACTIVE | COMMUNITY
Start: 2025-06-17 | End: 1900-01-01

## 2025-07-03 ENCOUNTER — APPOINTMENT (OUTPATIENT)
Dept: ORTHOPEDIC SURGERY | Facility: CLINIC | Age: 70
End: 2025-07-03

## 2025-07-03 PROBLEM — M25.642 STIFFNESS OF FINGER JOINT OF LEFT HAND: Status: ACTIVE | Noted: 2025-07-03

## 2025-07-03 PROBLEM — M65.311 TRIGGER THUMB OF RIGHT HAND: Status: ACTIVE | Noted: 2025-07-03

## 2025-07-03 PROCEDURE — 20550 NJX 1 TENDON SHEATH/LIGAMENT: CPT | Mod: F5

## 2025-07-03 PROCEDURE — 73110 X-RAY EXAM OF WRIST: CPT | Mod: RT

## 2025-07-03 PROCEDURE — 99214 OFFICE O/P EST MOD 30 MIN: CPT | Mod: 25

## 2025-08-07 ENCOUNTER — APPOINTMENT (OUTPATIENT)
Dept: ORTHOPEDIC SURGERY | Facility: CLINIC | Age: 70
End: 2025-08-07
Payer: MEDICARE

## 2025-08-07 DIAGNOSIS — S52.551D OTHER EXTRAARTICULAR FRACTURE OF LOWER END OF RIGHT RADIUS, SUBSEQUENT ENCOUNTER FOR CLOSED FRACTURE WITH ROUTINE HEALING: ICD-10-CM

## 2025-08-07 PROCEDURE — 73110 X-RAY EXAM OF WRIST: CPT | Mod: RT

## 2025-08-07 PROCEDURE — 99213 OFFICE O/P EST LOW 20 MIN: CPT | Mod: 25
